# Patient Record
Sex: FEMALE | Race: ASIAN | NOT HISPANIC OR LATINO | Employment: FULL TIME | URBAN - METROPOLITAN AREA
[De-identification: names, ages, dates, MRNs, and addresses within clinical notes are randomized per-mention and may not be internally consistent; named-entity substitution may affect disease eponyms.]

---

## 2019-03-04 ENCOUNTER — OFFICE VISIT (OUTPATIENT)
Dept: INTERNAL MEDICINE | Facility: CLINIC | Age: 34
End: 2019-03-04

## 2019-03-04 VITALS
HEART RATE: 61 BPM | WEIGHT: 110 LBS | TEMPERATURE: 98 F | DIASTOLIC BLOOD PRESSURE: 52 MMHG | SYSTOLIC BLOOD PRESSURE: 86 MMHG | OXYGEN SATURATION: 99 % | BODY MASS INDEX: 22.18 KG/M2 | HEIGHT: 59 IN

## 2019-03-04 DIAGNOSIS — Z00.00 PHYSICAL EXAM, ANNUAL: ICD-10-CM

## 2019-03-04 DIAGNOSIS — Z00.00 PHYSICAL EXAM, ANNUAL: Primary | ICD-10-CM

## 2019-03-04 PROCEDURE — 99385 PREV VISIT NEW AGE 18-39: CPT | Performed by: FAMILY MEDICINE

## 2019-03-04 NOTE — PROGRESS NOTES
Subjective   Michelle Coronado is a 34 y.o. female.   Chief Complaint   Patient presents with   • Annual Exam       History of Present Illness     This is a new patient in our office.  She is here for complete physical exam without GYN evaluation.    #1 CPE-patient has no complaints.  She has no significant medical or surgical history.  Family history positive for diabetes in maternal great grandmother.  She does not smoke cigarettes.  She never did.  Alcohol 1-2 drinks a week.  No drugs.  She exercises 2-3 times a week for 30 minutes.  She does elliptical, she runs and walks.  She has a sedentary job as an .  She has dental appointments every 6 months.  She has eye exam once a year.  She was contacts.  She takes no prescription or over-the-counter medications.  She had tetanus vaccine most likely in 2012, but she is going to check with her GYN.  No flu vaccine this season.  Last Pap and pelvic exam breast exam in May 2018 by [facility].  No history of abnormal Pap smears.    Please see health history form which was reviewed by me and will be scanned.    The following portions of the patient's history were reviewed and updated as appropriate: allergies, current medications, past family history, past medical history, past social history, past surgical history and problem list.    Review of Systems   Constitutional: Negative.    HENT: Negative.    Respiratory: Negative.    Cardiovascular: Negative.    Gastrointestinal: Negative.    Genitourinary: Negative.    Neurological: Negative.    Psychiatric/Behavioral: Negative.          Objective   Wt Readings from Last 3 Encounters:   03/04/19 49.9 kg (110 lb)      Vitals:    03/04/19 1430   BP: (!) 86/52   Pulse: 61   Temp: 98 °F (36.7 °C)   SpO2: 99%     Temp Readings from Last 3 Encounters:   03/04/19 98 °F (36.7 °C)     BP Readings from Last 3 Encounters:   03/04/19 (!) 86/52     Pulse Readings from Last 3 Encounters:   03/04/19 61       Physical Exam    Constitutional: She is oriented to person, place, and time. She appears well-developed and well-nourished. No distress.   HENT:   Head: Normocephalic and atraumatic. Hair is normal.   Right Ear: Hearing, tympanic membrane, external ear and ear canal normal. No drainage. No decreased hearing is noted.   Left Ear: Hearing, tympanic membrane, external ear and ear canal normal. No decreased hearing is noted.   Nose: No nasal deformity.   Mouth/Throat: Oropharynx is clear and moist.   Eyes: Conjunctivae, EOM and lids are normal. Pupils are equal, round, and reactive to light. Right eye exhibits no discharge. Left eye exhibits no discharge.   Neck: Normal range of motion. Neck supple. No JVD present. No tracheal deviation present. No thyromegaly present.   Cardiovascular: Normal rate, regular rhythm, normal heart sounds, intact distal pulses and normal pulses. Exam reveals no gallop and no friction rub.   No murmur heard.  Pulmonary/Chest: Effort normal and breath sounds normal. No respiratory distress. She has no wheezes. She has no rales. She exhibits no tenderness.   Abdominal: Soft. She exhibits no distension and no mass. There is no tenderness. There is no rebound and no guarding. No hernia.   Musculoskeletal: Normal range of motion. She exhibits no edema, tenderness or deformity.   Lymphadenopathy:     She has no cervical adenopathy.   Neurological: She is alert and oriented to person, place, and time. She has normal reflexes. She displays normal reflexes. No cranial nerve deficit. She exhibits normal muscle tone. Coordination normal.   Skin: Skin is warm and dry. No rash noted. She is not diaphoretic. No erythema.   Psychiatric: She has a normal mood and affect. Her behavior is normal. Judgment and thought content normal.   Vitals reviewed.      Assessment/Plan   Michelle was seen today for annual exam.    Diagnoses and all orders for this visit:    Physical exam, annual  -     CBC (No Diff); Future  -     Lipid  Panel With LDL / HDL Ratio; Future  -     Comprehensive Metabolic Panel; Future  -     Vitamin D 25 Hydroxy; Future  -     Thyroid Cascade Profile; Future  -     Urinalysis With Microscopic If Indicated (No Culture) - Urine, Clean Catch; Future        #1 CPE-patient will check about date of last tetanus vaccine.  She will increase exercise to at least 5 days a week for 30 minutes.  We are checking labs today.  She is up-to-date with cancer screening.

## 2019-03-05 LAB
25(OH)D3+25(OH)D2 SERPL-MCNC: 17.6 NG/ML (ref 30–100)
ALBUMIN SERPL-MCNC: 4.7 G/DL (ref 3.5–5.2)
ALBUMIN/GLOB SERPL: 2 G/DL
ALP SERPL-CCNC: 59 U/L (ref 39–117)
ALT SERPL-CCNC: 9 U/L (ref 1–33)
APPEARANCE UR: CLEAR
AST SERPL-CCNC: 13 U/L (ref 1–32)
BACTERIA #/AREA URNS HPF: ABNORMAL /HPF
BILIRUB SERPL-MCNC: 0.6 MG/DL (ref 0.1–1.2)
BILIRUB UR QL STRIP: NEGATIVE
BUN SERPL-MCNC: 10 MG/DL (ref 6–20)
BUN/CREAT SERPL: 15.2 (ref 7–25)
CALCIUM SERPL-MCNC: 9.6 MG/DL (ref 8.6–10.5)
CHLORIDE SERPL-SCNC: 104 MMOL/L (ref 98–107)
CHOLEST SERPL-MCNC: 204 MG/DL (ref 0–200)
CO2 SERPL-SCNC: 24.1 MMOL/L (ref 22–29)
COLOR UR: YELLOW
CREAT SERPL-MCNC: 0.66 MG/DL (ref 0.57–1)
EPI CELLS #/AREA URNS HPF: ABNORMAL /HPF
ERYTHROCYTE [DISTWIDTH] IN BLOOD BY AUTOMATED COUNT: 13.9 % (ref 12.3–15.4)
GLOBULIN SER CALC-MCNC: 2.4 GM/DL
GLUCOSE SERPL-MCNC: 80 MG/DL (ref 65–99)
GLUCOSE UR QL: NEGATIVE
HCT VFR BLD AUTO: 41.4 % (ref 34–46.6)
HDLC SERPL-MCNC: 91 MG/DL (ref 40–60)
HGB BLD-MCNC: 12.8 G/DL (ref 12–15.9)
HGB UR QL STRIP: NEGATIVE
KETONES UR QL STRIP: ABNORMAL
LDLC SERPL CALC-MCNC: 103 MG/DL (ref 0–100)
LDLC/HDLC SERPL: 1.13 {RATIO}
LEUKOCYTE ESTERASE UR QL STRIP: ABNORMAL
MCH RBC QN AUTO: 24.5 PG (ref 26.6–33)
MCHC RBC AUTO-ENTMCNC: 30.9 G/DL (ref 31.5–35.7)
MCV RBC AUTO: 79.3 FL (ref 79–97)
MUCOUS THREADS URNS QL MICRO: ABNORMAL /HPF
NITRITE UR QL STRIP: NEGATIVE
PH UR STRIP: 5.5 [PH] (ref 5–8)
PLATELET # BLD AUTO: 304 10*3/MM3 (ref 140–450)
POTASSIUM SERPL-SCNC: 4.3 MMOL/L (ref 3.5–5.2)
PROT SERPL-MCNC: 7.1 G/DL (ref 6–8.5)
PROT UR QL STRIP: NEGATIVE
RBC # BLD AUTO: 5.22 10*6/MM3 (ref 3.77–5.28)
RBC #/AREA URNS HPF: ABNORMAL /HPF
SODIUM SERPL-SCNC: 142 MMOL/L (ref 136–145)
SP GR UR: ABNORMAL (ref 1–1.03)
TRIGL SERPL-MCNC: 49 MG/DL (ref 0–150)
TSH SERPL DL<=0.005 MIU/L-ACNC: 0.62 UIU/ML (ref 0.45–4.5)
UROBILINOGEN UR STRIP-MCNC: ABNORMAL MG/DL
VLDLC SERPL CALC-MCNC: 9.8 MG/DL (ref 5–40)
WBC # BLD AUTO: 7.88 10*3/MM3 (ref 3.4–10.8)
WBC #/AREA URNS HPF: ABNORMAL /HPF

## 2020-02-26 ENCOUNTER — TRANSCRIBE ORDERS (OUTPATIENT)
Dept: ADMINISTRATIVE | Facility: HOSPITAL | Age: 35
End: 2020-02-26

## 2020-02-26 DIAGNOSIS — R10.11 RUQ PAIN: Primary | ICD-10-CM

## 2020-03-02 ENCOUNTER — HOSPITAL ENCOUNTER (OUTPATIENT)
Dept: ULTRASOUND IMAGING | Facility: HOSPITAL | Age: 35
Discharge: HOME | End: 2020-03-02
Admitting: OBSTETRICS & GYNECOLOGY

## 2020-03-02 DIAGNOSIS — R10.11 RUQ PAIN: ICD-10-CM

## 2020-03-02 PROCEDURE — 76705 ECHO EXAM OF ABDOMEN: CPT

## 2021-04-16 ENCOUNTER — BULK ORDERING (OUTPATIENT)
Dept: CASE MANAGEMENT | Facility: OTHER | Age: 36
End: 2021-04-16

## 2021-04-16 DIAGNOSIS — Z23 IMMUNIZATION DUE: ICD-10-CM

## 2021-05-13 ENCOUNTER — TELEPHONE (OUTPATIENT)
Dept: INTERNAL MEDICINE | Facility: CLINIC | Age: 36
End: 2021-05-13

## 2021-05-13 NOTE — TELEPHONE ENCOUNTER
Caller: Michelle Coronado    Relationship to patient: Self    Best call back number: [phone]    Patient is needing: SCHEDULED PHYSICAL WITH  [facility] . PATIENT PCP DIDN'T HAVE ANYTHING UNTIL July AND PATIENT NEEDS PHYSICAL FOR EMPLOYER  BY June .

## 2021-05-26 ENCOUNTER — OFFICE VISIT (OUTPATIENT)
Dept: INTERNAL MEDICINE | Facility: CLINIC | Age: 36
End: 2021-05-26

## 2021-05-26 VITALS
SYSTOLIC BLOOD PRESSURE: 100 MMHG | HEIGHT: 59 IN | TEMPERATURE: 97.5 F | DIASTOLIC BLOOD PRESSURE: 62 MMHG | WEIGHT: 134 LBS | HEART RATE: 79 BPM | BODY MASS INDEX: 27.01 KG/M2 | OXYGEN SATURATION: 98 %

## 2021-05-26 DIAGNOSIS — E55.9 VITAMIN D DEFICIENCY: ICD-10-CM

## 2021-05-26 DIAGNOSIS — Z00.00 PHYSICAL EXAM, ANNUAL: Primary | ICD-10-CM

## 2021-05-26 PROCEDURE — 99395 PREV VISIT EST AGE 18-39: CPT | Performed by: FAMILY MEDICINE

## 2021-05-26 NOTE — PROGRESS NOTES
Subjective   Michelle Coronado is a 36 y.o. female.   Chief Complaint   Patient presents with   • Annual Exam       History of Present Illness     Patient is here for complete physical exam without GYN evaluation.    #1 CPE-she has no complaints.    In 2020 she delivered healthy baby boy.  She had a  because he was breech.  She breast-fed for 9 months.    Otherwise there is no change in medical, surgical or family history from last office visit.  She takes no prescription medications.  Over-the-counter she takes loratidine as needed.  She is not allergic to any new medications.  She does not use tobacco products.  She drinks 1-2 drinks a week.  No drugs.  She exercises 3-4 times a week.  She runs for about 30 minutes.  She has regular dental appointments, but last was more than a year ago.  Last eye exam was more than a year ago.  She wears contact lenses.  She saw her GYN in 2020.  She is up-to-date with Pap smears.  She had tetanus vaccine in 2020.  She had both Covid vaccinations.    The following portions of the patient's history were reviewed and updated as appropriate: allergies, current medications, past family history, past medical history, past social history, past surgical history and problem list.    Review of Systems   Constitutional: Negative.    HENT: Negative.    Respiratory: Negative.  Negative for shortness of breath.    Cardiovascular: Negative.  Negative for chest pain.   Gastrointestinal: Negative for blood in stool.   Genitourinary: Negative for hematuria.   Neurological: Negative for light-headedness.   Psychiatric/Behavioral: Negative.          Objective   Wt Readings from Last 3 Encounters:   21 60.8 kg (134 lb)   19 49.9 kg (110 lb)      Vitals:    21 1012   BP: 100/62   Pulse: 79   Temp: 97.5 °F (36.4 °C)   SpO2: 98%     Temp Readings from Last 3 Encounters:   21 97.5 °F (36.4 °C)   19 98 °F (36.7 °C)     BP Readings from Last 3  Encounters:   05/26/21 100/62   03/04/19 (!) 86/52     Pulse Readings from Last 3 Encounters:   05/26/21 79   03/04/19 61     Body mass index is 27.06 kg/m².    Physical Exam  Vitals reviewed.   Constitutional:       General: She is not in acute distress.     Appearance: Normal appearance. She is well-developed. She is not diaphoretic.   HENT:      Head: Normocephalic and atraumatic. Hair is normal.      Right Ear: Hearing, tympanic membrane, ear canal and external ear normal. No decreased hearing noted. No drainage.      Left Ear: Hearing, tympanic membrane, ear canal and external ear normal. No decreased hearing noted.      Nose: No nasal deformity.   Eyes:      General: Lids are normal.         Right eye: No discharge.         Left eye: No discharge.      Conjunctiva/sclera: Conjunctivae normal.      Pupils: Pupils are equal, round, and reactive to light.   Neck:      Thyroid: No thyromegaly.      Vascular: No JVD.      Trachea: No tracheal deviation.   Cardiovascular:      Rate and Rhythm: Normal rate and regular rhythm.      Pulses: Normal pulses.      Heart sounds: Normal heart sounds. No murmur heard.   No friction rub. No gallop.    Pulmonary:      Effort: Pulmonary effort is normal. No respiratory distress.      Breath sounds: Normal breath sounds. No wheezing or rales.   Chest:      Chest wall: No tenderness.   Abdominal:      General: There is no distension.      Palpations: Abdomen is soft. There is no mass.      Tenderness: There is no abdominal tenderness. There is no guarding or rebound.      Hernia: No hernia is present.   Musculoskeletal:         General: No tenderness or deformity. Normal range of motion.      Cervical back: Normal range of motion and neck supple.   Lymphadenopathy:      Cervical: No cervical adenopathy.      Upper Body:      Right upper body: No supraclavicular adenopathy.      Left upper body: No supraclavicular adenopathy.   Skin:     General: Skin is warm and dry.       Findings: No erythema or rash.   Neurological:      Mental Status: She is alert and oriented to person, place, and time.      Cranial Nerves: No cranial nerve deficit.      Motor: No abnormal muscle tone.      Coordination: Coordination normal.      Deep Tendon Reflexes: Reflexes are normal and symmetric. Reflexes normal.   Psychiatric:         Behavior: Behavior normal.         Thought Content: Thought content normal.         Judgment: Judgment normal.         Assessment/Plan   Diagnoses and all orders for this visit:    1. Physical exam, annual (Primary)  -     CBC (No Diff); Future  -     Comprehensive Metabolic Panel; Future  -     Lipid Panel With LDL / HDL Ratio; Future  -     Vitamin D 25 Hydroxy; Future  -     Thyroid Cascade Profile; Future    2. Vitamin D deficiency        #1 CPE-preventing counseling provided.  We are ordering labs.  She is advised to work on weight loss.  She gained 24 pounds comparing to prior to pregnancy.  She is advised to decrease portion size.  To increase exercise to at least 5 days a week.  She is advised to use my fitness pal or [employer].   She is overdue for dental appointment and eye exam and is advised to schedule both.  She will be due for tetanus vaccine in April 2030.  She is up-to-date with cancer screening.

## 2022-01-05 ENCOUNTER — OFFICE VISIT (OUTPATIENT)
Dept: INTERNAL MEDICINE | Facility: CLINIC | Age: 37
End: 2022-01-05

## 2022-01-05 VITALS
BODY MASS INDEX: 27.21 KG/M2 | WEIGHT: 135 LBS | OXYGEN SATURATION: 99 % | HEART RATE: 93 BPM | DIASTOLIC BLOOD PRESSURE: 70 MMHG | HEIGHT: 59 IN | TEMPERATURE: 98 F | SYSTOLIC BLOOD PRESSURE: 100 MMHG

## 2022-01-05 DIAGNOSIS — M79.631 PAIN OF RIGHT FOREARM: Primary | ICD-10-CM

## 2022-01-05 PROCEDURE — 99213 OFFICE O/P EST LOW 20 MIN: CPT | Performed by: FAMILY MEDICINE

## 2022-01-05 NOTE — PROGRESS NOTES
Subjective   Michelle Coronado is a 36 y.o. female.   Chief Complaint   Patient presents with   • Arm Pain     started yesterday        History of Present Illness     #1  Right forearm pain-it started yesterday in the morning.  There was no triggering event.  No injury, but she started doing weights on Monday after a long break.  Pain started gradually.  Pain was dull, on and off, but mostly on, intensity 5 out of 10.  It was worse when she was typing.  Forearm felt swollen but didn't look swollen.  She noticed some red spots on the right forearm and as she has a history of hives she took Benadryl.  It helped with the red spots but did not help with the sensation of swelling or pain.  No numbness, no tingling.  Pain resolved spontaneously by the end of the day.  She went running and it started hurting her a little again.    The following portions of the patient's history were reviewed and updated as appropriate: allergies, current medications, past medical history, past social history and problem list.    Review of Systems   Respiratory: Negative.    Cardiovascular: Negative.          Objective   Wt Readings from Last 3 Encounters:   01/05/22 61.2 kg (135 lb)   05/26/21 60.8 kg (134 lb)   03/04/19 49.9 kg (110 lb)      Vitals:    01/05/22 1457   BP: 100/70   Pulse: 93   Temp: 98 °F (36.7 °C)   SpO2: 99%     Temp Readings from Last 3 Encounters:   01/05/22 98 °F (36.7 °C)   05/26/21 97.5 °F (36.4 °C)   03/04/19 98 °F (36.7 °C)     BP Readings from Last 3 Encounters:   01/05/22 100/70   05/26/21 100/62   03/04/19 (!) 86/52     Pulse Readings from Last 3 Encounters:   01/05/22 93   05/26/21 79   03/04/19 61     Body mass index is 27.25 kg/m².    Physical Exam  Constitutional:       Appearance: Normal appearance.   Cardiovascular:      Pulses: Normal pulses.   Pulmonary:      Effort: Pulmonary effort is normal.   Musculoskeletal:      Comments: Mild tenderness over the tendon at the right elbow.  No swelling, no skin  changes.  Normal range of motion.  Normal strength.   Neurological:      Mental Status: She is alert.         Assessment/Plan   Diagnoses and all orders for this visit:    1. Pain of right forearm (Primary)        #1 right forearm pain-I think it was triggered by combination of long hours at the computer and starting doing weights.  She is advised to do stretching prior to exercise.  To adjust her work environment.  She will use naproxen for a few days as needed.  Follow-up as needed.

## 2022-04-06 ENCOUNTER — TELEPHONE (OUTPATIENT)
Dept: INTERNAL MEDICINE | Facility: CLINIC | Age: 37
End: 2022-04-06

## 2022-04-06 NOTE — TELEPHONE ENCOUNTER
OKAY FOR HUB TO READ :  LEFT MESSAGE FOR PT REGARDING PHYSICAL SCHEDULED 6/10/22. PROVIDER WILL NOT BE IN OFFICE. NEXT AVAILABLE DATE FOR PHYSICAL IS 7/1/22. SAVED SLOT FOR PT IF IT DOES NOT WORK, WE CAN ALWAYS RESCHEDULE.

## 2022-04-12 NOTE — TELEPHONE ENCOUNTER
PATIENT WAS READ HUB TO READ MESSAGE AND THAT DATE DID NOT WORK FOR HER. SHE RESCHEDULED WITH JERICA SOTOMAYOR DUE TO NEEDING HER PHYSICAL DONE BEFORE END OF June.

## 2022-06-06 DIAGNOSIS — Z00.00 ANNUAL PHYSICAL EXAM: Primary | ICD-10-CM

## 2022-06-08 LAB
ALBUMIN SERPL-MCNC: 4.2 G/DL (ref 3.8–4.8)
ALBUMIN/GLOB SERPL: 1.8 {RATIO} (ref 1.2–2.2)
ALP SERPL-CCNC: 82 IU/L (ref 44–121)
ALT SERPL-CCNC: 26 IU/L (ref 0–32)
AST SERPL-CCNC: 23 IU/L (ref 0–40)
BILIRUB SERPL-MCNC: <0.2 MG/DL (ref 0–1.2)
BUN SERPL-MCNC: 14 MG/DL (ref 6–20)
BUN/CREAT SERPL: 22 (ref 9–23)
CALCIUM SERPL-MCNC: 9.3 MG/DL (ref 8.7–10.2)
CHLORIDE SERPL-SCNC: 104 MMOL/L (ref 96–106)
CHOLEST SERPL-MCNC: 205 MG/DL (ref 100–199)
CO2 SERPL-SCNC: 26 MMOL/L (ref 20–29)
CREAT SERPL-MCNC: 0.65 MG/DL (ref 0.57–1)
EGFRCR SERPLBLD CKD-EPI 2021: 118 ML/MIN/1.73
ERYTHROCYTE [DISTWIDTH] IN BLOOD BY AUTOMATED COUNT: 13.3 % (ref 11.7–15.4)
GLOBULIN SER CALC-MCNC: 2.4 G/DL (ref 1.5–4.5)
GLUCOSE SERPL-MCNC: 91 MG/DL (ref 65–99)
HCT VFR BLD AUTO: 43.2 % (ref 34–46.6)
HDLC SERPL-MCNC: 51 MG/DL
HGB BLD-MCNC: 13.4 G/DL (ref 11.1–15.9)
LDLC SERPL CALC-MCNC: 143 MG/DL (ref 0–99)
LDLC/HDLC SERPL: 2.8 RATIO (ref 0–3.2)
MCH RBC QN AUTO: 24.5 PG (ref 26.6–33)
MCHC RBC AUTO-ENTMCNC: 31 G/DL (ref 31.5–35.7)
MCV RBC AUTO: 79 FL (ref 79–97)
PLATELET # BLD AUTO: 406 X10E3/UL (ref 150–450)
POTASSIUM SERPL-SCNC: 4.4 MMOL/L (ref 3.5–5.2)
PROT SERPL-MCNC: 6.6 G/DL (ref 6–8.5)
RBC # BLD AUTO: 5.46 X10E6/UL (ref 3.77–5.28)
SODIUM SERPL-SCNC: 141 MMOL/L (ref 134–144)
TRIGL SERPL-MCNC: 63 MG/DL (ref 0–149)
TSH SERPL DL<=0.005 MIU/L-ACNC: 0.46 UIU/ML (ref 0.45–4.5)
VLDLC SERPL CALC-MCNC: 11 MG/DL (ref 5–40)
WBC # BLD AUTO: 7.4 X10E3/UL (ref 3.4–10.8)

## 2022-06-09 ENCOUNTER — OFFICE VISIT (OUTPATIENT)
Dept: INTERNAL MEDICINE | Facility: CLINIC | Age: 37
End: 2022-06-09

## 2022-06-09 VITALS
BODY MASS INDEX: 26.39 KG/M2 | OXYGEN SATURATION: 97 % | TEMPERATURE: 97.7 F | DIASTOLIC BLOOD PRESSURE: 60 MMHG | SYSTOLIC BLOOD PRESSURE: 104 MMHG | HEART RATE: 69 BPM | WEIGHT: 130.9 LBS | HEIGHT: 59 IN

## 2022-06-09 DIAGNOSIS — Z00.00 HEALTHCARE MAINTENANCE: Primary | ICD-10-CM

## 2022-06-09 DIAGNOSIS — E55.9 VITAMIN D DEFICIENCY: ICD-10-CM

## 2022-06-09 DIAGNOSIS — L50.9 HIVES: ICD-10-CM

## 2022-06-09 PROBLEM — Z34.90 PREGNANCY: Status: RESOLVED | Noted: 2020-06-03 | Resolved: 2022-06-09

## 2022-06-09 PROBLEM — S39.91XA ABDOMINAL TRAUMA: Status: ACTIVE | Noted: 2020-05-22

## 2022-06-09 PROBLEM — S39.91XA ABDOMINAL TRAUMA: Status: RESOLVED | Noted: 2020-05-22 | Resolved: 2022-06-09

## 2022-06-09 PROBLEM — Z34.90 PREGNANCY: Status: ACTIVE | Noted: 2020-06-03

## 2022-06-09 PROCEDURE — 99395 PREV VISIT EST AGE 18-39: CPT | Performed by: NURSE PRACTITIONER

## 2022-06-09 RX ORDER — MELATONIN
1000 DAILY
COMMUNITY

## 2022-06-09 NOTE — PROGRESS NOTES
Subjective   Michelle Coronado is a 37 y.o. female.     Chief Complaint   Patient presents with   • Annual Exam   • hives     Pt c/o random hives in different parts of body that goes way X 3 days.        History of Present Illness   She is here today as a new patient to me for CPE and lab follow up. She is feeling well today. She is now eating a lower carb diet, limiting sugars and carbs. She is exercising 4-5 days a week for 20-25 minutes with resistance training and cardio.     Hives- she has noticed over the past 3 days intermittent hives on her body. She noticed hives on her thigh when she woke up, also with some swelling in her fingers. The hives are pruritic, improve after a few hours. Then she developed hives on her forehead. She has tried claritin and benadryl with improvement. She has recently made dietary changes and eliminated carbs and sugars. She recently had COVID a few weeks ago. She has previously had hives in the past associated with trigger foods including MSG.     GYN- she is followed by GYN. She was last seen in 2020. Menses is regular, has occasional spotting. Last pap completed in 2019, normal.     She is  with [facility] and [facility].     The following portions of the patient's history were reviewed and updated as appropriate: allergies, current medications, past family history, past medical history, past social history, past surgical history and problem list.    Review of Systems   Constitutional: Negative for appetite change, chills, diaphoresis, fatigue, fever, unexpected weight gain and unexpected weight loss.   HENT: Negative for congestion, dental problem, ear pain, hearing loss, mouth sores, nosebleeds, postnasal drip, rhinorrhea, sinus pressure, sore throat, swollen glands, tinnitus and trouble swallowing.    Eyes: Negative for blurred vision, pain, discharge, redness, itching and visual disturbance.   Respiratory: Negative for cough, chest tightness, shortness of breath and  wheezing.    Cardiovascular: Negative for chest pain, palpitations and leg swelling.   Gastrointestinal: Negative for abdominal distention, abdominal pain, blood in stool, constipation, diarrhea, nausea, vomiting and GERD.   Endocrine: Negative for cold intolerance and heat intolerance.   Genitourinary: Negative for breast discharge, breast lump, breast pain, decreased libido, difficulty urinating, dyspareunia, dysuria, flank pain, frequency, hematuria, menstrual problem, pelvic pain, pelvic pressure, urgency, urinary incontinence, vaginal bleeding and vaginal discharge.   Musculoskeletal: Negative for arthralgias, back pain, gait problem, joint swelling, myalgias and neck pain.   Skin: Positive for rash. Negative for color change and skin lesions.   Allergic/Immunologic: Positive for environmental allergies. Negative for food allergies.   Neurological: Negative for dizziness, tremors, seizures, syncope, speech difficulty, weakness, light-headedness, numbness, headache, memory problem and confusion.   Hematological: Negative for adenopathy. Does not bruise/bleed easily.   Psychiatric/Behavioral: Negative for sleep disturbance, suicidal ideas, depressed mood and stress. The patient is not nervous/anxious.        Objective   Physical Exam  Constitutional:       Appearance: Normal appearance. She is well-developed.   HENT:      Head: Normocephalic and atraumatic.      Right Ear: Hearing, tympanic membrane, ear canal and external ear normal.      Left Ear: Hearing, tympanic membrane, ear canal and external ear normal.      Nose: Nose normal.      Right Sinus: No maxillary sinus tenderness or frontal sinus tenderness.      Left Sinus: No maxillary sinus tenderness or frontal sinus tenderness.      Mouth/Throat:      Lips: Pink.      Mouth: Mucous membranes are moist.      Dentition: Normal dentition.      Tongue: No lesions.      Pharynx: Oropharynx is clear. Uvula midline.      Tonsils: No tonsillar exudate.   Eyes:       General: Lids are normal.      Extraocular Movements: Extraocular movements intact.      Conjunctiva/sclera: Conjunctivae normal.      Pupils: Pupils are equal, round, and reactive to light.   Neck:      Thyroid: No thyroid mass, thyromegaly or thyroid tenderness.      Vascular: No carotid bruit.      Trachea: Trachea normal.   Cardiovascular:      Rate and Rhythm: Normal rate and regular rhythm.      Pulses: Normal pulses.           Radial pulses are 2+ on the right side and 2+ on the left side.        Popliteal pulses are 2+ on the right side and 2+ on the left side.        Dorsalis pedis pulses are 2+ on the right side and 2+ on the left side.        Posterior tibial pulses are 2+ on the right side and 2+ on the left side.      Heart sounds: S1 normal and S2 normal.   Pulmonary:      Effort: Pulmonary effort is normal.      Breath sounds: Normal breath sounds.   Chest:   Breasts:      Right: No supraclavicular adenopathy.      Left: No supraclavicular adenopathy.       Abdominal:      General: Bowel sounds are normal. There is no distension or abdominal bruit.      Palpations: Abdomen is soft. There is no hepatomegaly or splenomegaly.      Tenderness: There is no abdominal tenderness.      Hernia: No hernia is present.   Musculoskeletal:      Cervical back: Normal range of motion and neck supple.      Right lower leg: No edema.      Left lower leg: No edema.   Lymphadenopathy:      Head:      Right side of head: No submental, submandibular, tonsillar or occipital adenopathy.      Left side of head: No submental, submandibular, tonsillar or occipital adenopathy.      Cervical: No cervical adenopathy.      Upper Body:      Right upper body: No supraclavicular adenopathy.      Left upper body: No supraclavicular adenopathy.      Lower Body: No right inguinal adenopathy. No left inguinal adenopathy.   Skin:     General: Skin is warm and dry.      Findings: No rash.      Nails: There is no clubbing.   Neurological:       Mental Status: She is alert and oriented to person, place, and time.      Cranial Nerves: Cranial nerves are intact.      Motor: Motor function is intact.      Coordination: Coordination is intact.      Gait: Gait is intact.      Deep Tendon Reflexes:      Reflex Scores:       Patellar reflexes are 2+ on the right side and 2+ on the left side.  Psychiatric:         Attention and Perception: Attention normal.         Mood and Affect: Mood and affect normal.         Speech: Speech normal.         Behavior: Behavior normal.         Thought Content: Thought content normal.         Cognition and Memory: Cognition normal.         Vitals:    06/09/22 1451   BP: 104/60   Pulse: 69   Temp: 97.7 °F (36.5 °C)   SpO2: 97%      Body mass index is 26.42 kg/m².    Assessment & Plan   Diagnoses and all orders for this visit:    1. Healthcare maintenance (Primary)    2. Vitamin D deficiency    3. Hives      Lab results discussed with patient in office today.    1.  Preventative counseling-encouraged healthy, Mediterranean-style diet and continuing regular exercise.  Ensure adequate intake of calcium through diet.  2.  Hives-? Post COVID reaction versus allergic response.  Recommend keeping a journal for possible triggers.  Okay to continue antihistamine as needed.  Notify for worsening symptoms.  3.  Vitamin D deficiency-continue vitamin D D3 2000 units daily.  Continue weightbearing exercise.    GYN-due.  She will schedule this.  Eye exam up-to-date  Wear sunscreen outside  Dentist up-to-date    Follow-up in 1 year for CPE with fasting labs prior.      normal...

## 2023-06-08 DIAGNOSIS — E55.9 VITAMIN D DEFICIENCY: ICD-10-CM

## 2023-06-08 DIAGNOSIS — Z00.00 HEALTHCARE MAINTENANCE: Primary | ICD-10-CM

## 2023-06-10 LAB
ALBUMIN SERPL-MCNC: 4.6 G/DL (ref 3.5–5.2)
ALBUMIN/GLOB SERPL: 1.9 G/DL
ALP SERPL-CCNC: 68 U/L (ref 39–117)
ALT SERPL-CCNC: 10 U/L (ref 1–33)
AST SERPL-CCNC: 14 U/L (ref 1–32)
BASOPHILS # BLD AUTO: 0.04 10*3/MM3 (ref 0–0.2)
BASOPHILS NFR BLD AUTO: 0.6 % (ref 0–1.5)
BILIRUB SERPL-MCNC: 0.3 MG/DL (ref 0–1.2)
BUN SERPL-MCNC: 13 MG/DL (ref 6–20)
BUN/CREAT SERPL: 21 (ref 7–25)
CALCIUM SERPL-MCNC: 9.9 MG/DL (ref 8.6–10.5)
CHLORIDE SERPL-SCNC: 104 MMOL/L (ref 98–107)
CHOLEST SERPL-MCNC: 207 MG/DL (ref 0–200)
CHOLEST/HDLC SERPL: 3.18 {RATIO}
CO2 SERPL-SCNC: 26.3 MMOL/L (ref 22–29)
CREAT SERPL-MCNC: 0.62 MG/DL (ref 0.57–1)
EGFRCR SERPLBLD CKD-EPI 2021: 118.5 ML/MIN/1.73
EOSINOPHIL # BLD AUTO: 0.48 10*3/MM3 (ref 0–0.4)
EOSINOPHIL NFR BLD AUTO: 6.9 % (ref 0.3–6.2)
ERYTHROCYTE [DISTWIDTH] IN BLOOD BY AUTOMATED COUNT: 13.8 % (ref 12.3–15.4)
GLOBULIN SER CALC-MCNC: 2.4 GM/DL
GLUCOSE SERPL-MCNC: 82 MG/DL (ref 65–99)
HCT VFR BLD AUTO: 40.8 % (ref 34–46.6)
HDLC SERPL-MCNC: 65 MG/DL (ref 40–60)
HGB BLD-MCNC: 13.2 G/DL (ref 12–15.9)
IMM GRANULOCYTES # BLD AUTO: 0.02 10*3/MM3 (ref 0–0.05)
IMM GRANULOCYTES NFR BLD AUTO: 0.3 % (ref 0–0.5)
LDLC SERPL CALC-MCNC: 132 MG/DL (ref 0–100)
LYMPHOCYTES # BLD AUTO: 2.5 10*3/MM3 (ref 0.7–3.1)
LYMPHOCYTES NFR BLD AUTO: 35.8 % (ref 19.6–45.3)
MCH RBC QN AUTO: 24.6 PG (ref 26.6–33)
MCHC RBC AUTO-ENTMCNC: 32.4 G/DL (ref 31.5–35.7)
MCV RBC AUTO: 76 FL (ref 79–97)
MONOCYTES # BLD AUTO: 0.45 10*3/MM3 (ref 0.1–0.9)
MONOCYTES NFR BLD AUTO: 6.4 % (ref 5–12)
NEUTROPHILS # BLD AUTO: 3.5 10*3/MM3 (ref 1.7–7)
NEUTROPHILS NFR BLD AUTO: 50 % (ref 42.7–76)
NRBC BLD AUTO-RTO: 0.1 /100 WBC (ref 0–0.2)
PLATELET # BLD AUTO: 326 10*3/MM3 (ref 140–450)
POTASSIUM SERPL-SCNC: 4.5 MMOL/L (ref 3.5–5.2)
PROT SERPL-MCNC: 7 G/DL (ref 6–8.5)
RBC # BLD AUTO: 5.37 10*6/MM3 (ref 3.77–5.28)
SODIUM SERPL-SCNC: 140 MMOL/L (ref 136–145)
TRIGL SERPL-MCNC: 57 MG/DL (ref 0–150)
TSH SERPL DL<=0.005 MIU/L-ACNC: 0.78 UIU/ML (ref 0.45–4.5)
VLDLC SERPL CALC-MCNC: 10 MG/DL (ref 5–40)
WBC # BLD AUTO: 6.99 10*3/MM3 (ref 3.4–10.8)

## 2023-06-14 ENCOUNTER — OFFICE VISIT (OUTPATIENT)
Dept: INTERNAL MEDICINE | Facility: CLINIC | Age: 38
End: 2023-06-14
Payer: COMMERCIAL

## 2023-06-14 VITALS
TEMPERATURE: 98 F | HEIGHT: 59 IN | HEART RATE: 81 BPM | SYSTOLIC BLOOD PRESSURE: 96 MMHG | OXYGEN SATURATION: 98 % | DIASTOLIC BLOOD PRESSURE: 70 MMHG | WEIGHT: 126 LBS | BODY MASS INDEX: 25.4 KG/M2

## 2023-06-14 DIAGNOSIS — Z00.00 ANNUAL PHYSICAL EXAM: Primary | ICD-10-CM

## 2023-06-14 PROCEDURE — 99395 PREV VISIT EST AGE 18-39: CPT | Performed by: FAMILY MEDICINE

## 2023-06-14 NOTE — PROGRESS NOTES
Subjective   Michelle Coronado is a 38 y.o. female.   Chief Complaint   Patient presents with    Annual Exam       History of Present Illness     CPE-patient is here for complete physical exam without evaluation.  She has no complaints.  She lost 9 pounds from last visit.  She did it by using [employer].    There is no change in medical or surgical history from last office visit.  No hospitalizations or ER visits.  No change in family history.  She does not take any prescription medications.  Over-the-counter she takes vitamin D 1000 units a day and multivitamin.  She also takes Claritin as needed.  She is not allergic to any new medications.  She does not use tobacco products, she drinks 1-2 drinks a week.  No drugs.  She has overall healthy diet.  She cooks at home.  Lots of fruits and vegetables.  She walks/runs 3-4 times a week for 30 minutes.  She has dental appointments every 6 months.  Last eye exam was in August 2022.  She wears contacts.  Last GYN evaluation was in 2020.  She had COVID-vaccine x5.  Tetanus vaccine in April 2021.      Review of Systems   Constitutional:  Negative for chills and fever.   Respiratory:  Negative for shortness of breath.    Cardiovascular:  Negative for chest pain and palpitations.   Gastrointestinal:  Negative for blood in stool.   Genitourinary:  Negative for hematuria.   Neurological:  Negative for light-headedness.   Psychiatric/Behavioral: Negative.  Negative for suicidal ideas.        Objective   Wt Readings from Last 3 Encounters:   06/14/23 57.2 kg (126 lb)   06/09/22 59.4 kg (130 lb 14.4 oz)   01/05/22 61.2 kg (135 lb)      Vitals:    06/14/23 0810   BP: 96/70   Pulse: 81   Temp: 98 °F (36.7 °C)   SpO2: 98%     Temp Readings from Last 3 Encounters:   06/14/23 98 °F (36.7 °C)   06/09/22 97.7 °F (36.5 °C)   01/05/22 98 °F (36.7 °C)     BP Readings from Last 3 Encounters:   06/14/23 96/70   06/09/22 104/60   01/05/22 100/70     Pulse Readings from Last 3 Encounters:   06/14/23 81    06/09/22 69   01/05/22 93     Body mass index is 25.45 kg/m².    Physical Exam  Vitals reviewed.   Constitutional:       General: She is not in acute distress.     Appearance: She is well-developed. She is not diaphoretic.   HENT:      Head: Normocephalic and atraumatic. Hair is normal.      Right Ear: Hearing, tympanic membrane, ear canal and external ear normal. No decreased hearing noted. No drainage.      Left Ear: Hearing, tympanic membrane, ear canal and external ear normal. No decreased hearing noted.      Nose: No nasal deformity.   Eyes:      General: Lids are normal.         Right eye: No discharge.         Left eye: No discharge.      Conjunctiva/sclera: Conjunctivae normal.      Pupils: Pupils are equal, round, and reactive to light.   Neck:      Thyroid: No thyromegaly.      Vascular: No JVD.      Trachea: No tracheal deviation.   Cardiovascular:      Rate and Rhythm: Normal rate and regular rhythm.      Pulses: Normal pulses.      Heart sounds: Normal heart sounds. No murmur heard.    No friction rub. No gallop.   Pulmonary:      Effort: Pulmonary effort is normal. No respiratory distress.      Breath sounds: Normal breath sounds. No wheezing or rales.   Chest:      Chest wall: No tenderness.   Abdominal:      General: There is no distension.      Palpations: Abdomen is soft. There is no mass.      Tenderness: There is no abdominal tenderness. There is no guarding or rebound.      Hernia: No hernia is present.   Musculoskeletal:         General: No tenderness or deformity. Normal range of motion.      Cervical back: Normal range of motion and neck supple.   Lymphadenopathy:      Cervical: No cervical adenopathy.      Upper Body:      Right upper body: No supraclavicular adenopathy.      Left upper body: No supraclavicular adenopathy.   Skin:     General: Skin is warm and dry.      Findings: No erythema or rash.   Neurological:      Mental Status: She is alert and oriented to person, place, and time.       Cranial Nerves: No cranial nerve deficit.      Motor: No abnormal muscle tone.      Coordination: Coordination normal.      Deep Tendon Reflexes: Reflexes are normal and symmetric. Reflexes normal.   Psychiatric:         Behavior: Behavior normal.         Thought Content: Thought content normal.         Judgment: Judgment normal.       Assessment & Plan   Diagnoses and all orders for this visit:    1. Annual physical exam (Primary)        CPE-preventive counseling provided.  Blood work results reviewed with patient.  She is up-to-date with vaccinations.  She is planning to call her GYN to schedule Pap smear.  Continue regular dental appointments at least every 6 months.        BMI is >= 25 and <30. (Overweight) The following options were offered after discussion;: weight loss educational material (shared in after visit summary) and exercise counseling/recommendations

## 2024-06-07 DIAGNOSIS — E55.9 VITAMIN D DEFICIENCY: Primary | ICD-10-CM

## 2024-06-07 DIAGNOSIS — Z00.00 ANNUAL PHYSICAL EXAM: ICD-10-CM

## 2024-06-11 LAB
25(OH)D3+25(OH)D2 SERPL-MCNC: 23.6 NG/ML (ref 30–100)
ALBUMIN SERPL-MCNC: 4.3 G/DL (ref 3.9–4.9)
ALBUMIN/GLOB SERPL: 2.2 {RATIO}
ALP SERPL-CCNC: 71 IU/L (ref 44–121)
ALT SERPL-CCNC: 8 IU/L (ref 0–32)
AST SERPL-CCNC: 14 IU/L (ref 0–40)
BILIRUB SERPL-MCNC: 0.3 MG/DL (ref 0–1.2)
BUN SERPL-MCNC: 11 MG/DL (ref 6–20)
BUN/CREAT SERPL: 16 (ref 9–23)
CALCIUM SERPL-MCNC: 9.2 MG/DL (ref 8.7–10.2)
CHLORIDE SERPL-SCNC: 106 MMOL/L (ref 96–106)
CHOLEST SERPL-MCNC: 208 MG/DL (ref 100–199)
CO2 SERPL-SCNC: 24 MMOL/L (ref 20–29)
CREAT SERPL-MCNC: 0.69 MG/DL (ref 0.57–1)
EGFRCR SERPLBLD CKD-EPI 2021: 115 ML/MIN/1.73
ERYTHROCYTE [DISTWIDTH] IN BLOOD BY AUTOMATED COUNT: 13.4 % (ref 11.7–15.4)
GLOBULIN SER CALC-MCNC: 2 G/DL (ref 1.5–4.5)
GLUCOSE SERPL-MCNC: 95 MG/DL (ref 70–99)
HCT VFR BLD AUTO: 40.7 % (ref 34–46.6)
HDLC SERPL-MCNC: 63 MG/DL
HGB BLD-MCNC: 12.6 G/DL (ref 11.1–15.9)
LDL CALC COMMENT:: ABNORMAL
LDLC SERPL CALC-MCNC: 130 MG/DL (ref 0–99)
LDLC/HDLC SERPL: 2.1 RATIO (ref 0–3.2)
MCH RBC QN AUTO: 24.3 PG (ref 26.6–33)
MCHC RBC AUTO-ENTMCNC: 31 G/DL (ref 31.5–35.7)
MCV RBC AUTO: 79 FL (ref 79–97)
PLATELET # BLD AUTO: 347 X10E3/UL (ref 150–450)
POTASSIUM SERPL-SCNC: 4.3 MMOL/L (ref 3.5–5.2)
PROT SERPL-MCNC: 6.3 G/DL (ref 6–8.5)
RBC # BLD AUTO: 5.18 X10E6/UL (ref 3.77–5.28)
SODIUM SERPL-SCNC: 141 MMOL/L (ref 134–144)
TRIGL SERPL-MCNC: 85 MG/DL (ref 0–149)
TSH SERPL DL<=0.005 MIU/L-ACNC: 0.85 UIU/ML (ref 0.45–4.5)
VLDLC SERPL CALC-MCNC: 15 MG/DL (ref 5–40)
WBC # BLD AUTO: 5.9 X10E3/UL (ref 3.4–10.8)

## 2024-06-17 ENCOUNTER — OFFICE VISIT (OUTPATIENT)
Dept: INTERNAL MEDICINE | Facility: CLINIC | Age: 39
End: 2024-06-17
Payer: COMMERCIAL

## 2024-06-17 VITALS
WEIGHT: 125 LBS | TEMPERATURE: 97.2 F | OXYGEN SATURATION: 98 % | HEIGHT: 59 IN | SYSTOLIC BLOOD PRESSURE: 100 MMHG | BODY MASS INDEX: 25.2 KG/M2 | HEART RATE: 65 BPM | DIASTOLIC BLOOD PRESSURE: 60 MMHG

## 2024-06-17 DIAGNOSIS — E55.9 VITAMIN D DEFICIENCY: ICD-10-CM

## 2024-06-17 DIAGNOSIS — Z00.00 ANNUAL PHYSICAL EXAM: Primary | ICD-10-CM

## 2024-06-17 PROCEDURE — 99395 PREV VISIT EST AGE 18-39: CPT | Performed by: FAMILY MEDICINE

## 2024-06-17 NOTE — PROGRESS NOTES
Subjective   Michelle Coronado is a 39 y.o. female.   Chief Complaint   Patient presents with    Annual Exam       History of Present Illness     CPE-patient is here for complete physical exam without evaluation.  She has no complaints.     There is no change in medical or surgical history from last office visit.  No hospitalizations or ER visits.  No change in family history.  She does not take any prescription medications.    Over-the-counter -Claritin as needed.  She is not allergic to any new medications.  She does not use tobacco products, she drinks 2-3 drinks a week.  No drugs.  She has overall healthy diet.  She cooks at home.  Lots of fruits and vegetables.  She walks 2-3 times a week for 30 minutes.  She runs irregularly.  She has dental appointments every 6 months.  She wears contacts-yearly eye exams.  Last GYN evaluation was in 2020.  She is scheduled this week.  She had COVID-vaccine x5.  Tetanus vaccine in April 2021.    Vitamin D 23.6.  She does not take supplemental vitamin D.    Review of Systems   Constitutional:  Negative for chills and fever.   Respiratory:  Negative for shortness of breath.    Cardiovascular:  Negative for chest pain and palpitations.   Gastrointestinal:  Negative for blood in stool.   Genitourinary:  Negative for hematuria.   Neurological:  Negative for light-headedness.   Psychiatric/Behavioral: Negative.  Negative for suicidal ideas.          Objective   Wt Readings from Last 3 Encounters:   06/17/24 56.7 kg (125 lb)   06/14/23 57.2 kg (126 lb)   06/09/22 59.4 kg (130 lb 14.4 oz)      Vitals:    06/17/24 1319   BP: 100/60   Pulse: 65   Temp: 97.2 °F (36.2 °C)   SpO2: 98%     Temp Readings from Last 3 Encounters:   06/17/24 97.2 °F (36.2 °C)   06/14/23 98 °F (36.7 °C)   06/09/22 97.7 °F (36.5 °C)     BP Readings from Last 3 Encounters:   06/17/24 100/60   06/14/23 96/70   06/09/22 104/60     Pulse Readings from Last 3 Encounters:   06/17/24 65   06/14/23 81   06/09/22 69      Body mass index is 25.25 kg/m².    Physical Exam  Vitals reviewed.   Constitutional:       General: She is not in acute distress.     Appearance: She is well-developed. She is not diaphoretic.   HENT:      Head: Normocephalic and atraumatic. Hair is normal.      Right Ear: Hearing, tympanic membrane, ear canal and external ear normal. No decreased hearing noted. No drainage.      Left Ear: Hearing, tympanic membrane, ear canal and external ear normal. No decreased hearing noted.      Nose: No nasal deformity.   Eyes:      General: Lids are normal.         Right eye: No discharge.         Left eye: No discharge.      Conjunctiva/sclera: Conjunctivae normal.      Pupils: Pupils are equal, round, and reactive to light.   Neck:      Thyroid: No thyromegaly.      Vascular: No JVD.      Trachea: No tracheal deviation.   Cardiovascular:      Rate and Rhythm: Normal rate and regular rhythm.      Pulses: Normal pulses.      Heart sounds: Normal heart sounds. No murmur heard.     No friction rub. No gallop.   Pulmonary:      Effort: Pulmonary effort is normal. No respiratory distress.      Breath sounds: Normal breath sounds. No wheezing or rales.   Chest:      Chest wall: No tenderness.   Abdominal:      General: There is no distension.      Palpations: Abdomen is soft. There is no mass.      Tenderness: There is no abdominal tenderness. There is no guarding or rebound.      Hernia: No hernia is present.   Musculoskeletal:         General: No tenderness or deformity. Normal range of motion.      Cervical back: Normal range of motion.   Lymphadenopathy:      Cervical: No cervical adenopathy.      Upper Body:      Right upper body: No supraclavicular adenopathy.      Left upper body: No supraclavicular adenopathy.   Skin:     General: Skin is warm and dry.      Findings: No erythema or rash.   Neurological:      Mental Status: She is alert and oriented to person, place, and time.      Cranial Nerves: No cranial nerve  deficit.      Motor: No abnormal muscle tone.      Coordination: Coordination normal.      Deep Tendon Reflexes: Reflexes are normal and symmetric. Reflexes normal.   Psychiatric:         Behavior: Behavior normal.         Thought Content: Thought content normal.         Judgment: Judgment normal.         Assessment & Plan   Diagnoses and all orders for this visit:    1. Annual physical exam (Primary)    2. Vitamin D deficiency        CPE-preventive counseling provided.  Blood work results reviewed with patient.  She is scheduled with GYN next week.  Discussed recommendations for vaccinations including COVID-vaccine.  Continue regular dental appointments at least every 6 months.  Sun protection recommended.  Increase physical activity to 30 to 60 minutes a day.  Vitamin D deficiency-uncontrolled.  Start vitamin D3 2000 units a day.  Follow-up in 12 months.          BMI is >= 25 and <30. (Overweight) The following options were offered after discussion;: weight loss educational material (shared in after visit summary) and nutrition counseling/recommendations

## 2025-06-12 DIAGNOSIS — Z00.00 ANNUAL PHYSICAL EXAM: ICD-10-CM

## 2025-06-12 DIAGNOSIS — E55.9 VITAMIN D DEFICIENCY: Primary | ICD-10-CM

## 2025-06-14 LAB
25(OH)D3+25(OH)D2 SERPL-MCNC: 20.5 NG/ML (ref 30–100)
ALBUMIN SERPL-MCNC: 4.3 G/DL (ref 3.5–5.2)
ALBUMIN/GLOB SERPL: 2.2 G/DL
ALP SERPL-CCNC: 75 U/L (ref 39–117)
ALT SERPL-CCNC: 8 U/L (ref 1–33)
AST SERPL-CCNC: 13 U/L (ref 1–32)
BILIRUB SERPL-MCNC: 0.6 MG/DL (ref 0–1.2)
BUN SERPL-MCNC: 12 MG/DL (ref 6–20)
BUN/CREAT SERPL: 19 (ref 7–25)
CALCIUM SERPL-MCNC: 9.3 MG/DL (ref 8.6–10.5)
CHLORIDE SERPL-SCNC: 104 MMOL/L (ref 98–107)
CHOLEST SERPL-MCNC: 211 MG/DL (ref 0–200)
CO2 SERPL-SCNC: 21.7 MMOL/L (ref 22–29)
CREAT SERPL-MCNC: 0.63 MG/DL (ref 0.57–1)
EGFRCR SERPLBLD CKD-EPI 2021: 116.6 ML/MIN/1.73
ERYTHROCYTE [DISTWIDTH] IN BLOOD BY AUTOMATED COUNT: 14 % (ref 12.3–15.4)
GLOBULIN SER CALC-MCNC: 2 GM/DL
GLUCOSE SERPL-MCNC: 90 MG/DL (ref 65–99)
HCT VFR BLD AUTO: 40.8 % (ref 34–46.6)
HDLC SERPL-MCNC: 64 MG/DL (ref 40–60)
HGB BLD-MCNC: 12.8 G/DL (ref 12–15.9)
LDLC SERPL CALC-MCNC: 133 MG/DL (ref 0–100)
LDLC/HDLC SERPL: 2.06 {RATIO}
MCH RBC QN AUTO: 24.8 PG (ref 26.6–33)
MCHC RBC AUTO-ENTMCNC: 31.4 G/DL (ref 31.5–35.7)
MCV RBC AUTO: 79.1 FL (ref 79–97)
PLATELET # BLD AUTO: 348 10*3/MM3 (ref 140–450)
POTASSIUM SERPL-SCNC: 4.2 MMOL/L (ref 3.5–5.2)
PROT SERPL-MCNC: 6.3 G/DL (ref 6–8.5)
RBC # BLD AUTO: 5.16 10*6/MM3 (ref 3.77–5.28)
SODIUM SERPL-SCNC: 138 MMOL/L (ref 136–145)
TRIGL SERPL-MCNC: 76 MG/DL (ref 0–150)
TSH SERPL DL<=0.005 MIU/L-ACNC: 0.68 UIU/ML (ref 0.45–4.5)
VLDLC SERPL CALC-MCNC: 14 MG/DL (ref 5–40)
WBC # BLD AUTO: 7.16 10*3/MM3 (ref 3.4–10.8)

## 2025-06-20 ENCOUNTER — OFFICE VISIT (OUTPATIENT)
Dept: INTERNAL MEDICINE | Facility: CLINIC | Age: 40
End: 2025-06-20
Payer: COMMERCIAL

## 2025-06-20 VITALS
OXYGEN SATURATION: 100 % | HEIGHT: 59 IN | TEMPERATURE: 98.2 F | HEART RATE: 73 BPM | SYSTOLIC BLOOD PRESSURE: 116 MMHG | WEIGHT: 120 LBS | BODY MASS INDEX: 24.19 KG/M2 | DIASTOLIC BLOOD PRESSURE: 70 MMHG

## 2025-06-20 DIAGNOSIS — Z00.00 ANNUAL PHYSICAL EXAM: Primary | ICD-10-CM

## 2025-06-20 DIAGNOSIS — E55.9 VITAMIN D DEFICIENCY: ICD-10-CM

## 2025-06-20 PROCEDURE — 99395 PREV VISIT EST AGE 18-39: CPT | Performed by: FAMILY MEDICINE

## 2025-06-20 NOTE — PROGRESS NOTES
Subjective   Michelle Coronado is a 40 y.o. female.   Chief Complaint   Patient presents with    Annual Exam       History of Present Illness     CPE-patient is here for complete physical exam without evaluation.  She has no complaints.     There is no change in medical or surgical history from last office visit.  No hospitalizations or ER visits.  No change in family history.  She does not take any prescription medications.    Over-the-counter -loratadine in spring as needed.  She is not allergic to any new medications.  She does not use tobacco products, she drinks 1-2 drinks a week.  No drugs.  She has overall healthy diet.  She cooks at home.  Lots of fruits and vegetables.  She walks couple times a week for 30 minutes.   She has dental appointments every 6 months.  She wears contacts-yearly eye exams.  Stable.  Last GYN evaluation was in 7/2024.  She reports normal Pap smear.  She had COVID-vaccine x5.  Tetanus vaccine in April 2021.     Vitamin D 20.5. She does not take supplemental vitamin D.  153, HDL 64.      Review of Systems   Respiratory:  Negative for shortness of breath.    Cardiovascular:  Negative for chest pain and palpitations.   Gastrointestinal:  Negative for blood in stool.   Genitourinary:  Negative for hematuria.   Neurological:  Negative for light-headedness.   Psychiatric/Behavioral: Negative.  Negative for suicidal ideas.          Objective   Wt Readings from Last 3 Encounters:   06/20/25 54.4 kg (120 lb)   06/17/24 56.7 kg (125 lb)   06/14/23 57.2 kg (126 lb)      Vitals:    06/20/25 0803   BP: 116/70   Pulse: 73   Temp: 98.2 °F (36.8 °C)   SpO2: 100%     Temp Readings from Last 3 Encounters:   06/20/25 98.2 °F (36.8 °C)   06/17/24 97.2 °F (36.2 °C)   06/14/23 98 °F (36.7 °C)     BP Readings from Last 3 Encounters:   06/20/25 116/70   06/17/24 100/60   06/14/23 96/70     Pulse Readings from Last 3 Encounters:   06/20/25 73   06/17/24 65   06/14/23 81     Body mass index is 24.24  kg/m².    Physical Exam  Vitals reviewed.   Constitutional:       General: She is not in acute distress.     Appearance: She is well-developed. She is not diaphoretic.   HENT:      Head: Hair is normal.      Right Ear: Hearing, tympanic membrane, ear canal and external ear normal. No decreased hearing noted. No drainage.      Left Ear: Hearing, tympanic membrane, ear canal and external ear normal. No decreased hearing noted.      Nose: No nasal deformity.   Eyes:      General: Lids are normal.         Right eye: No discharge.         Left eye: No discharge.      Conjunctiva/sclera: Conjunctivae normal.      Pupils: Pupils are equal, round, and reactive to light.   Neck:      Thyroid: No thyromegaly.      Vascular: No JVD.      Trachea: No tracheal deviation.   Cardiovascular:      Rate and Rhythm: Normal rate and regular rhythm.      Pulses: Normal pulses.      Heart sounds: Normal heart sounds. No murmur heard.     No friction rub. No gallop.   Pulmonary:      Effort: Pulmonary effort is normal. No respiratory distress.      Breath sounds: Normal breath sounds. No wheezing or rales.   Chest:      Chest wall: No tenderness.   Abdominal:      General: There is no distension.      Palpations: Abdomen is soft. There is no mass.      Tenderness: There is no abdominal tenderness. There is no guarding or rebound.      Hernia: No hernia is present.   Musculoskeletal:         General: No tenderness or deformity. Normal range of motion.      Cervical back: Normal range of motion.   Lymphadenopathy:      Cervical: No cervical adenopathy.      Upper Body:      Right upper body: No supraclavicular adenopathy.      Left upper body: No supraclavicular adenopathy.   Skin:     General: Skin is warm and dry.      Findings: No erythema or rash.   Neurological:      Mental Status: She is alert and oriented to person, place, and time.      Cranial Nerves: No cranial nerve deficit.      Motor: No abnormal muscle tone.      Coordination:  Coordination normal.      Deep Tendon Reflexes: Reflexes are normal and symmetric. Reflexes normal.   Psychiatric:         Behavior: Behavior normal.         Thought Content: Thought content normal.         Judgment: Judgment normal.         Assessment & Plan   Diagnoses and all orders for this visit:    1. Annual physical exam (Primary)    2. Vitamin D deficiency        CPE-preventive counseling provided.  Blood work results reviewed with patient.  She is advised to exercise at least 30 minutes a day, 5 days a week.  Continue regular dental appointments at least every 6 months.  She is up-to-date with cancer screening.  She follows up with GYN yearly.  We discussed recommendations for vaccinations including flu vaccine, tetanus vaccine and COVID-vaccine.  We discussed increased risk for cancer with any alcohol use.    Vitamin D deficiency - uncontrolled, start vitamin D3 2000 units a day.  Follow-up in 12 months.